# Patient Record
Sex: MALE | Race: NATIVE HAWAIIAN OR OTHER PACIFIC ISLANDER | HISPANIC OR LATINO | URBAN - METROPOLITAN AREA
[De-identification: names, ages, dates, MRNs, and addresses within clinical notes are randomized per-mention and may not be internally consistent; named-entity substitution may affect disease eponyms.]

---

## 2022-08-09 ENCOUNTER — PATIENT OUTREACH (OUTPATIENT)
Dept: OTHER | Facility: OTHER | Age: 37
End: 2022-08-09

## 2022-08-09 DIAGNOSIS — M54.9 MUSCULOSKELETAL BACK PAIN: Primary | ICD-10-CM

## 2022-08-09 RX ORDER — IBUPROFEN 600 MG/1
600 TABLET ORAL EVERY 6 HOURS PRN
Qty: 20 TABLET | Refills: 0 | Status: SHIPPED | OUTPATIENT
Start: 2022-08-09

## 2022-08-09 RX ORDER — BACLOFEN 10 MG/1
10 TABLET ORAL 3 TIMES DAILY PRN
Qty: 15 TABLET | Refills: 0 | Status: SHIPPED | OUTPATIENT
Start: 2022-08-09

## 2022-08-09 NOTE — PROGRESS NOTES
Pt was evaluated at the [employer] in the [facility] with [address] notes he has been newly homeless for the past 3 weeks  Currently living in a tent  Requests assistance with medical insurance and food stamps  Pt notes he has been trying to obtain a job, but noting barriers due to recent conviction  Pt notes he has 5-6 months of pain in his left upper back, radiating up his left scapula and to the base of his neck and his left neck muscles  No numbness/weakness  He is right handed  Does not carry any backpack/bags on his shoulder  Denies any trauma/heavy lifting  Denies any exacerbating/allviating factors  Notes full rom  Pt states he sleeps on his side, which may be triggering his symptoms  Has not tried any medication for this  PMH:   None    PSH:   None    Meds: none    Family hx:   Father  lung CA  H/o DM with amputations    Soc hx:  +tob 1/3ppd  No etoh  Rare MJ    PE  122/68  Gen: pleasant male  NAD  Non-tachypnic  Heart: RRR no m/r/g  Lungs; CTA bilat  No w/c/r  Abd: soft Nt/ND  NABS  No HSM  Ext: no edema  Full rom  LUE with full rom, int/ext rotation  M-skeletal:  Tender with palpation of left trap, sternocleidomastoid, left paraspinal muscles upper thoracic  A/P:  1-musculoskeletal pain:  Due to position and overuse  Rx; anti-inflammatory/muscle relaxant with ibuprofen/baclofen prn    2-homeless:  Pt will meet with [address] [facility] further to coordinate obtaining medical insurance, food stamps, job training/career center  Will also provide pt a list of where food and meals are being distributed daily locally      Pt will be referrd for PCP at [place] for routine health maintainence and lab work

## 2022-08-10 ENCOUNTER — PATIENT OUTREACH (OUTPATIENT)
Dept: OTHER | Facility: OTHER | Age: 37
End: 2022-08-10

## 2022-08-10 NOTE — PROGRESS NOTES
Tuesday August 9, 2022    Encountered client at [address] during [place]  Client requests to be evaluated on the [facility]  Client shared that he is homeless, staying in a tent, doesn't have health insurance and not receiving food stamps  Client interested in establishing care at [facility]  Will follow-up with client tomorrow to connect with PCP and send voucher to [address] [place] [address] during business hours  Client pleasant and verbalizes appreciation for assistance  Emotional support given

## 2022-08-15 ENCOUNTER — PATIENT OUTREACH (OUTPATIENT)
Dept: FAMILY MEDICINE CLINIC | Facility: CLINIC | Age: 37
End: 2022-08-15

## 2022-08-15 ENCOUNTER — OFFICE VISIT (OUTPATIENT)
Dept: FAMILY MEDICINE CLINIC | Facility: CLINIC | Age: 37
End: 2022-08-15

## 2022-08-15 VITALS
DIASTOLIC BLOOD PRESSURE: 60 MMHG | TEMPERATURE: 98.2 F | HEART RATE: 77 BPM | WEIGHT: 147 LBS | OXYGEN SATURATION: 96 % | HEIGHT: 72 IN | BODY MASS INDEX: 19.91 KG/M2 | RESPIRATION RATE: 19 BRPM | SYSTOLIC BLOOD PRESSURE: 118 MMHG

## 2022-08-15 DIAGNOSIS — H40.053 INCREASED PRESSURE IN THE EYE, BILATERAL: ICD-10-CM

## 2022-08-15 DIAGNOSIS — Z13.29 SCREENING FOR THYROID DISORDER: ICD-10-CM

## 2022-08-15 DIAGNOSIS — Z00.00 ANNUAL PHYSICAL EXAM: Primary | ICD-10-CM

## 2022-08-15 DIAGNOSIS — Z13.220 ENCOUNTER FOR LIPID SCREENING FOR CARDIOVASCULAR DISEASE: ICD-10-CM

## 2022-08-15 DIAGNOSIS — Z23 ENCOUNTER FOR IMMUNIZATION: ICD-10-CM

## 2022-08-15 DIAGNOSIS — Z01.20 ENCOUNTER FOR DENTAL EXAMINATION: ICD-10-CM

## 2022-08-15 DIAGNOSIS — Z13.1 SCREENING FOR DIABETES MELLITUS: ICD-10-CM

## 2022-08-15 DIAGNOSIS — Z11.4 ENCOUNTER FOR SCREENING FOR HUMAN IMMUNODEFICIENCY VIRUS (HIV): ICD-10-CM

## 2022-08-15 DIAGNOSIS — Z13.0 SCREENING FOR DEFICIENCY ANEMIA: ICD-10-CM

## 2022-08-15 DIAGNOSIS — Z59.9 FINANCIAL DIFFICULTIES: ICD-10-CM

## 2022-08-15 DIAGNOSIS — S16.1XXA NECK STRAIN, INITIAL ENCOUNTER: ICD-10-CM

## 2022-08-15 DIAGNOSIS — Z13.6 ENCOUNTER FOR LIPID SCREENING FOR CARDIOVASCULAR DISEASE: ICD-10-CM

## 2022-08-15 DIAGNOSIS — Z11.59 ENCOUNTER FOR HEPATITIS C SCREENING TEST FOR LOW RISK PATIENT: ICD-10-CM

## 2022-08-15 PROCEDURE — 99385 PREV VISIT NEW AGE 18-39: CPT | Performed by: PHYSICIAN ASSISTANT

## 2022-08-15 SDOH — ECONOMIC STABILITY - INCOME SECURITY: PROBLEM RELATED TO HOUSING AND ECONOMIC CIRCUMSTANCES, UNSPECIFIED: Z59.9

## 2022-08-15 NOTE — PROGRESS NOTES
[address] [facility] JAQUAN    NAME: Julia Bose  AGE: 40 y o  SEX: male  : 1985     DATE: 8/15/2022     Assessment and Plan:     Neck strain, initial encounter  Neck pain likely musculoskeletal in origin as well as his flank pain  Continue ibuprofen 3 times daily as needed with food  Baclofen as needed  Discussed possible side effects  Discussed alternating between heat and ice  Reviewed stretches with patient and will provide handout  Will follow up in one month and order PT at that time if no improvement  Follow up sooner if needed  ER precautions given  Annual physical exam  Discussed health maintenance  Will order routine lab work  Increased pressure in the eye, bilateral  Patient believes he was old several years ago that he may have glaucoma in his eyes  He did not follow up on it at that time  He denies any complaints currently  Will refer to ophthalmology for further evaluation  Financial difficulties  Patient would like to assistance  Open to speaking with [employer] and Social Work  Referrals placed  Warm hand off during visit  Encounter for immunization  Patient working on getting insurance  Will defer until next visit  Encounter for dental examination  Referred to [place] [facility]  Problem List Items Addressed This Visit        Musculoskeletal and Integument    Neck strain, initial encounter     Neck pain likely musculoskeletal in origin as well as his flank pain  Continue ibuprofen 3 times daily as needed with food  Baclofen as needed  Discussed possible side effects  Discussed alternating between heat and ice  Reviewed stretches with patient and will provide handout  Will follow up in one month and order PT at that time if no improvement  Follow up sooner if needed  ER precautions given  Other    Annual physical exam - Primary     Discussed health maintenance   Will order routine lab work  Increased pressure in the eye, bilateral     Patient believes he was old several years ago that he may have glaucoma in his eyes  He did not follow up on it at that time  He denies any complaints currently  Will refer to ophthalmology for further evaluation  Relevant Orders    Ambulatory Referral to Ophthalmology    Financial difficulties     Patient would like to assistance  Open to speaking with [employer] and Social Work  Referrals placed  Warm hand off during visit  Relevant Orders    Ambulatory referral to social work care management program    Ambulatory Referral to Financial Counseling Program    Encounter for immunization     Patient working on getting insurance  Will defer until next visit  Relevant Orders    TDAP VACCINE GREATER THAN OR EQUAL TO 6YO IM    Encounter for dental examination     Referred to [facility] dentistry  Relevant Orders    Ambulatory Referral to Dentistry    Encounter for screening for human immunodeficiency virus (HIV)    Relevant Orders    HIV 1/2 Antigen/Antibody (4th Generation) w Reflex [facility]    Encounter for hepatitis C screening test for low risk patient    Relevant Orders    Hepatitis C antibody      Other Visit Diagnoses     Screening for thyroid disorder        Relevant Orders    TSH, 3rd generation with Free T4 reflex    Screening for diabetes mellitus        Relevant Orders    HEMOGLOBIN A1C W/ EAG ESTIMATION    Comprehensive metabolic panel    Screening for deficiency anemia        Relevant Orders    CBC and differential    Encounter for lipid screening for cardiovascular disease        Relevant Orders    Lipid Panel with Direct LDL reflex          Immunizations and preventive care screenings were discussed with patient today  Appropriate education was printed on patient's after visit summary      Counseling:  Alcohol/drug use: discussed moderation in alcohol intake, the recommendations for healthy alcohol use, and avoidance of illicit drug use  Dental Health: discussed importance of regular tooth brushing, flossing, and dental visits  Injury prevention: discussed safety/seat belts, safety helmets, smoke detectors, carbon dioxide detectors, and smoking near bedding or upholstery  Sexual health: discussed sexually transmitted diseases, partner selection, use of condoms, avoidance of unintended pregnancy, and contraceptive alternatives  · Exercise: the importance of regular exercise/physical activity was discussed  Recommend exercise 3-5 times per week for at least 30 minutes  PHQ-2/9 Depression Screening    Little interest or pleasure in doing things: 0 - not at all  Feeling down, depressed, or hopeless: 1 - several days  PHQ-2 Score: 1  PHQ-2 Interpretation: Negative depression screen         Depression Screening and Follow-up Plan: Patient was screened for depression during today's encounter  They screened negative with a PHQ-2 score of 1  Tobacco Cessation Counseling: Tobacco cessation counseling was provided  The patient is sincerely urged to quit consumption of tobacco  He is not ready to quit tobacco  Medication options and side effects of medication discussed  Return in 4 weeks (on 9/12/2022) for Next scheduled follow up neck pain  Chief Complaint:     Chief Complaint   Patient presents with    New Patient Visit     Np here today c/oback pain and neck x couple months  per pt he start the new meds yesterday and states that Is working  baclofen and motrin      History of Present Illness:     Adult Annual Physical   Patient here for a comprehensive physical exam  The patient reports problems - neck pain  States it has been present for approximately two months or so  It is isolated to the left side  Non radiating  Increased pain with use  Ibuprofen and baclofen do provide relief  He is taking the ibuprofen 2-3 times daily with food  Baclofen at bedtime as it makes him tired   States his friend has been massaging it which also helps  Denies any numbness, tingling, or weakness  This has never happened before  No associated symptoms  No fever, chills, fatigue, or other prodromal symptoms  He also reports pain along his left side for about 2 weeks  The pain comes and goes  No trauma  He points to the left side directly above left hip  It is a sharp stabbing pain that last minutes or hours  No provocations  He has not tried anything for it yet  No associated symptoms as noted in ROS  Patient is also newly homeless  Approximately 4 weeks  Denies depression, anxiety, SI, or HI  Open to assistance  Diet and Physical Activity  · Diet/Nutrition: poor diet and limited fruits/vegetables  · Exercise: walking and more than 2 hours on average  Depression Screening  PHQ-2/9 Depression Screening    Little interest or pleasure in doing things: 0 - not at all  Feeling down, depressed, or hopeless: 1 - several days  PHQ-2 Score: 1  PHQ-2 Interpretation: Negative depression screen       General Health  · Sleep: sleeps well and gets 7-8 hours of sleep on average  · Hearing: normal - bilateral   · Vision: vision problems: possible glaucoma and most recent eye exam >1 year ago  · Dental: no dental visits for >1 year, brushes teeth twice daily and flosses teeth occasionally   Health  · History of STDs?: no      Review of Systems:     Review of Systems   Constitutional: Negative for appetite change, chills, diaphoresis, fatigue, fever and unexpected weight change  HENT: Negative for congestion, dental problem, hearing loss, sore throat, tinnitus and trouble swallowing  Eyes: Negative for photophobia, pain, redness and visual disturbance  Respiratory: Negative for cough, choking, chest tightness, shortness of breath, wheezing and stridor  Cardiovascular: Negative for chest pain, palpitations and leg swelling     Gastrointestinal: Negative for abdominal pain, blood in stool, constipation, diarrhea, nausea and vomiting  Endocrine: Negative for cold intolerance, heat intolerance, polydipsia, polyphagia and polyuria  Genitourinary: Positive for flank pain (left side)  Negative for difficulty urinating, dysuria, frequency, genital sores, hematuria, penile discharge, penile pain, penile swelling, scrotal swelling, testicular pain and urgency  Musculoskeletal: Positive for neck pain  Negative for arthralgias, back pain and myalgias  Skin: Negative for rash and wound  Hematological: Negative for adenopathy  Does not bruise/bleed easily  Psychiatric/Behavioral: Negative for agitation, behavioral problems, decreased concentration, dysphoric mood, self-injury, sleep disturbance and suicidal ideas  The patient is not nervous/anxious  Past Medical History:     History reviewed  No pertinent past medical history  Past Surgical History:     History reviewed  No pertinent surgical history  Social History:     Social History     Socioeconomic History    Marital status: Unknown     Spouse name: None    Number of children: None    Years of education: None    Highest education level: None   Occupational History    None   Tobacco Use    Smoking status: Current Every Day Smoker     Packs/day: 2 00     Types: Cigarettes    Smokeless tobacco: Former User   Substance and Sexual Activity    Alcohol use: None    Drug use: Yes     Types: Marijuana    Sexual activity: None   Other Topics Concern    None   Social History Narrative    None     Social Determinants of Health     Financial Resource Strain: Medium Risk    Difficulty of Paying Living Expenses: Somewhat hard   Food Insecurity: Food Insecurity Present    Worried About Running Out of Food in the Last Year: Sometimes true    Darlin of Food in the Last Year: Sometimes true   Transportation Needs: No Transportation Needs    Lack of Transportation (Medical): No    Lack of Transportation (Non-Medical):  No   Physical Activity: Not on file   Stress: Not on file   Social Connections: Not on file   Intimate Partner Violence: Not on file   Housing Stability: Not on file      Family History:     Family History   Problem Relation Age of Onset    Diabetes Mother     Cancer Father     Diabetes Father       Current Medications:     Current Outpatient Medications   Medication Sig Dispense Refill    baclofen 10 mg tablet Take 1 tablet (10 mg total) by mouth 3 (three) times a day as needed for muscle spasms 15 tablet 0    ibuprofen (MOTRIN) 600 mg tablet Take 1 tablet (600 mg total) by mouth every 6 (six) hours as needed for mild pain (take with food ) 20 tablet 0     No current facility-administered medications for this visit  Allergies:     No Known Allergies   Physical Exam:     /60 (BP Location: Left arm, Patient Position: Sitting, Cuff Size: Adult)   Pulse 77   Temp 98 2 °F (36 8 °C) (Temporal)   Resp 19   Ht 6' (1 829 m)   Wt 66 7 kg (147 lb)   SpO2 96%   BMI 19 94 kg/m²     Physical Exam  Vitals and nursing note reviewed  Constitutional:       General: He is awake  Appearance: Normal appearance  He is well-developed, well-groomed and normal weight  HENT:      Head: Normocephalic and atraumatic  Salivary Glands: Right salivary gland is not diffusely enlarged or tender  Left salivary gland is not diffusely enlarged or tender  Right Ear: Hearing, tympanic membrane, ear canal and external ear normal       Left Ear: Hearing, tympanic membrane, ear canal and external ear normal       Nose: Nose normal       Mouth/Throat:      Lips: Pink  No lesions  Mouth: Mucous membranes are moist       Tongue: No lesions  Tongue does not deviate from midline  Pharynx: Oropharynx is clear  Tonsils: No tonsillar exudate  Eyes:      General: Lids are normal       Extraocular Movements: Extraocular movements intact        Conjunctiva/sclera: Conjunctivae normal       Pupils: Pupils are equal, round, and reactive to light  Neck:      Thyroid: No thyroid mass, thyromegaly or thyroid tenderness  Vascular: No carotid bruit  Cardiovascular:      Rate and Rhythm: Normal rate and regular rhythm  Pulses: Normal pulses  Heart sounds: Normal heart sounds, S1 normal and S2 normal  No murmur heard  Pulmonary:      Effort: Pulmonary effort is normal  No respiratory distress  Breath sounds: Normal breath sounds  No stridor  No wheezing, rhonchi or rales  Abdominal:      General: Abdomen is flat  Bowel sounds are normal       Palpations: Abdomen is soft  Tenderness: There is no abdominal tenderness  Hernia: No hernia is present  Musculoskeletal:         General: No swelling  Normal range of motion  Cervical back: Normal range of motion and neck supple  Tenderness (left SC, paraspinals, and upper trapezius) present  No edema, erythema, signs of trauma, rigidity, torticollis or crepitus  Pain with movement (pain along left neck with movement) and muscular tenderness present  No spinous process tenderness  Normal range of motion  Right lower leg: No edema  Left lower leg: No edema  Lymphadenopathy:      Cervical: No cervical adenopathy  Right cervical: No superficial or posterior cervical adenopathy  Left cervical: No superficial or posterior cervical adenopathy  Skin:     General: Skin is warm and dry  Capillary Refill: Capillary refill takes less than 2 seconds  Findings: No rash  Neurological:      General: No focal deficit present  Mental Status: He is alert and oriented to person, place, and time  Mental status is at baseline  Cranial Nerves: Cranial nerves are intact  Sensory: Sensation is intact  Motor: Motor function is intact  Coordination: Coordination is intact  Gait: Gait is intact     Psychiatric:         Attention and Perception: Attention and perception normal          Mood and Affect: Mood normal  Speech: Speech normal          Behavior: Behavior normal  Behavior is cooperative  Thought Content:  Thought content normal          Cognition and Memory: Cognition normal          Judgment: Judgment normal           Margaux Galicia 35

## 2022-08-15 NOTE — ASSESSMENT & PLAN NOTE
Neck pain likely musculoskeletal in origin as well as his flank pain  Continue ibuprofen 3 times daily as needed with food  Baclofen as needed  Discussed possible side effects  Discussed alternating between heat and ice  Reviewed stretches with patient and will provide handout  Will follow up in one month and order PT at that time if no improvement  Follow up sooner if needed  ER precautions given

## 2022-08-15 NOTE — ASSESSMENT & PLAN NOTE
Patient believes he was old several years ago that he may have glaucoma in his eyes  He did not follow up on it at that time  He denies any complaints currently  Will refer to ophthalmology for further evaluation

## 2022-08-15 NOTE — ASSESSMENT & PLAN NOTE
Patient would like to assistance  Open to speaking with [employer] and Social Work  Referrals placed  Warm hand off during visit

## 2022-08-15 NOTE — PATIENT INSTRUCTIONS
Wellness Visit for Adults   AMBULATORY CARE:   A wellness visit  is when you see your healthcare provider to get screened for health problems  Your healthcare provider will also give you advice on how to stay healthy  Write down your questions so you remember to ask them  Ask your healthcare provider how often you should have a wellness visit  What happens at a wellness visit:  Your healthcare provider will ask about your health, and your family history of health problems  This includes high blood pressure, heart disease, and cancer  He or she will ask if you have symptoms that concern you, if you smoke, and about your mood  You may also be asked about your intake of medicines, supplements, food, and alcohol  Any of the following may be done:  · Your weight  will be checked  Your height may also be checked so your body mass index (BMI) can be calculated  Your BMI shows if you are at a healthy weight  · Your blood pressure  and heart rate will be checked  Your temperature may also be checked  · Blood and urine tests  may be done  Blood tests may be done to check your cholesterol levels  Abnormal cholesterol levels increase your risk for heart disease and stroke  You may also need a blood or urine test to check for diabetes if you are at increased risk  Urine tests may be done to look for signs of an infection or kidney disease  · A physical exam  includes checking your heartbeat and lungs with a stethoscope  Your healthcare provider may also check your skin to look for sun damage  · Screening tests  may be recommended  A screening test is done to check for diseases that may not cause symptoms  The screening tests you may need depend on your age, gender, family history, and lifestyle habits  For example, colorectal screening may be recommended if you are 48years old or older  Screening tests you need if you are a woman:   · A Pap smear  is used to screen for cervical cancer   Pap smears are usually done every 3 to 5 years depending on your age  You may need them more often if you have had abnormal Pap smear test results in the past  Ask your healthcare provider how often you should have a Pap smear  · A mammogram  is an x-ray of your breasts to screen for breast cancer  Experts recommend mammograms every 2 years starting at age 48 years  You may need a mammogram at age 52 years or younger if you have an increased risk for breast cancer  Talk to your healthcare provider about when you should start having mammograms and how often you need them  Vaccines you may need:   · Get an influenza vaccine  every year  The influenza vaccine protects you from the flu  Several types of viruses cause the flu  The viruses change over time, so new vaccines are made each year  · Get a tetanus-diphtheria (Td) booster vaccine  every 10 years  This vaccine protects you against tetanus and diphtheria  Tetanus is a severe infection that may cause painful muscle spasms and lockjaw  Diphtheria is a severe bacterial infection that causes a thick covering in the back of your mouth and throat  · Get a human papillomavirus (HPV) vaccine  if you are female and aged 23 to 32 or male 23 to 24 and never received it  This vaccine protects you from HPV infection  HPV is the most common infection spread by sexual contact  HPV may also cause vaginal, penile, and anal cancers  · Get a pneumococcal vaccine  if you are aged 72 years or older  The pneumococcal vaccine is an injection given to protect you from pneumococcal disease  Pneumococcal disease is an infection caused by pneumococcal bacteria  The infection may cause pneumonia, meningitis, or an ear infection  · Get a shingles vaccine  if you are 60 or older, even if you have had shingles before  The shingles vaccine is an injection to protect you from the varicella-zoster virus  This is the same virus that causes chickenpox   Shingles is a painful rash that develops in people who had chickenpox or have been exposed to the virus  How to eat healthy:  My Plate is a model for planning healthy meals  It shows the types and amounts of foods that should go on your plate  Fruits and vegetables make up about half of your plate, and grains and protein make up the other half  A serving of dairy is included on the side of your plate  The amount of calories and serving sizes you need depends on your age, gender, weight, and height  Examples of healthy foods are listed below:  · Eat a variety of vegetables  such as dark green, red, and orange vegetables  You can also include canned vegetables low in sodium (salt) and frozen vegetables without added butter or sauces  · Eat a variety of fresh fruits , canned fruit in 100% juice, frozen fruit, and dried fruit  · Include whole grains  At least half of the grains you eat should be whole grains  Examples include whole-wheat bread, wheat pasta, brown rice, and whole-grain cereals such as oatmeal     · Eat a variety of protein foods such as seafood (fish and shellfish), lean meat, and poultry without skin (turkey and chicken)  Examples of lean meats include pork leg, shoulder, or tenderloin, and beef round, sirloin, tenderloin, and extra lean ground beef  Other protein foods include eggs and egg substitutes, beans, peas, soy products, nuts, and seeds  · Choose low-fat dairy products such as skim or 1% milk or low-fat yogurt, cheese, and cottage cheese  · Limit unhealthy fats  such as butter, hard margarine, and shortening  Exercise:  Exercise at least 30 minutes per day on most days of the week  Some examples of exercise include walking, biking, dancing, and swimming  You can also fit in more physical activity by taking the stairs instead of the elevator or parking farther away from stores  Include muscle strengthening activities 2 days each week  Regular exercise provides many health benefits   It helps you manage your weight, and decreases your risk for type 2 diabetes, heart disease, stroke, and high blood pressure  Exercise can also help improve your mood  Ask your healthcare provider about the best exercise plan for you  General health and safety guidelines:   · Do not smoke  Nicotine and other chemicals in cigarettes and cigars can cause lung damage  Ask your healthcare provider for information if you currently smoke and need help to quit  E-cigarettes or smokeless tobacco still contain nicotine  Talk to your healthcare provider before you use these products  · Limit alcohol  A drink of alcohol is 12 ounces of beer, 5 ounces of wine, or 1½ ounces of liquor  · Lose weight, if needed  Being overweight increases your risk of certain health conditions  These include heart disease, high blood pressure, type 2 diabetes, and certain types of cancer  · Protect your skin  Do not sunbathe or use tanning beds  Use sunscreen with a SPF 15 or higher  Apply sunscreen at least 15 minutes before you go outside  Reapply sunscreen every 2 hours  Wear protective clothing, hats, and sunglasses when you are outside  · Drive safely  Always wear your seatbelt  Make sure everyone in your car wears a seatbelt  A seatbelt can save your life if you are in an accident  Do not use your cell phone when you are driving  This could distract you and cause an accident  Pull over if you need to make a call or send a text message  · Practice safe sex  Use latex condoms if are sexually active and have more than one partner  Your healthcare provider may recommend screening tests for sexually transmitted infections (STIs)  · Wear helmets, lifejackets, and protective gear  Always wear a helmet when you ride a bike or motorcycle, go skiing, or play sports that could cause a head injury  Wear protective equipment when you play sports  Wear a lifejacket when you are on a boat or doing water sports      © Copyright [employer] 2022 Information is for End User's use only and may not be sold, redistributed or otherwise used for commercial purposes  All illustrations and images included in CareNotes® are the copyrighted property of A D A M , Inc  or Chris Le  The above information is an  only  It is not intended as medical advice for individual conditions or treatments  Talk to your doctor, nurse or pharmacist before following any medical regimen to see if it is safe and effective for you  Cigarette Smoking and Your Health   AMBULATORY CARE:   Risks to your health if you smoke:  Nicotine and other chemicals found in tobacco and e-cigarettes can damage every cell in your body  Even if you are a light smoker, you have an increased risk for cancer, heart disease, and lung disease  If you are pregnant or have diabetes, smoking increases your risk for complications  Nicotine can affect an adolescent's developing brain  This can lead to trouble thinking, learning, or paying attention  Benefits to your health if you stop smoking:   · You decrease respiratory symptoms such as coughing, wheezing, and shortness of breath  · You reduce your risk for cancers of the lung, mouth, throat, kidney, bladder, pancreas, stomach, and cervix  If you already have cancer, you increase the benefits of chemotherapy  You also reduce your risk for cancer returning or a second cancer from developing  · You reduce your risk for heart disease, blood clots, heart attack, and stroke  · You reduce your risk for lung infections, and diseases such as pneumonia, asthma, chronic bronchitis, and emphysema  · Your circulation improves  More oxygen can be delivered to your body  If you have diabetes, you lower your risk for complications, such as kidney, artery, and eye diseases  You also lower your risk for nerve damage  Nerve damage can lead to amputations, poor vision, and blindness      · You improve your body's ability to heal and to fight infections  · An adolescent can help his or her brain and body develop in a healthy way  Talk to your adolescent about all the health risks of nicotine  If you can, start talking about nicotine when your child is younger than 12 years  This may make it easier for him or her not to start using nicotine as a teenager or adult  Explain to him or her that it is best never to start  It can be hard to try to quit later  Benefits to the health of others if you stop smoking:  Tobacco is harmful to nonsmokers who breathe in your secondhand smoke  The following are ways the health of others around you may improve when you stop smoking:  · You lower the risks for lung cancer and heart disease in nonsmoking adults  · If you are pregnant, you lower the risk for miscarriage, early delivery, low birth weight, and stillbirth  You also lower your baby's risk for SIDS, obesity, developmental delay, and neurobehavioral problems, such as ADHD  · If you have children, you lower their risk for ear infections, colds, pneumonia, bronchitis, and asthma  Follow up with your doctor as directed:  Write down your questions so you remember to ask them during your visits  For support and more information:   · American Lung Association  [address] [facility]  [address]  Phone: [facility] [address]  Phone: 2- 426 - 189-5220  Web Address: [employer]    · Smokefree  gov  Phone: 5- 362 - 021-8436  Web Address: www smokefree  [facility] 21 2022 Information is for End User's use only and may not be sold, redistributed or otherwise used for commercial purposes  All illustrations and images included in CareNotes® are the copyrighted property of [employer]  or [facility] Aryan Robledo   The above information is an  only  It is not intended as medical advice for individual conditions or treatments   Talk to your doctor, nurse or pharmacist before following any medical regimen to see if it is safe and effective for you

## 2022-08-16 NOTE — PROGRESS NOTES
BERNARDINO RODRIGUEZ had received an in-basket message from [facility] requesting social work meet with patient at his office visit  BERNARDINO RODRIGUEZ did consult with provider, Sadaf Sweet  The patient does not have insurance is homeless, needs SNAP benefits  He has hx of glaucoma but does not need an urgent appt  BERNARDINO RODRIGUEZ did meet with the patient, Jocelin Brown following office visit in the patient room  He has never had SNAP benefits  BERNARDINO RODRIGUEZ will provide him the number to contact to begin process  Jocelin Brown does not have any difficulty with transportation  He lives close to [facility] and does not have any specialist appts scheduled  Jocelin Brown is not currently employed  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[employer], 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  His father had passed away 1 month ago and since the job was new he was not able to take time off  While the news of his father being dx with cancer was recent it did progress quickly and he had passed away within 3 weeks  Jocelin Brown did feel prepared enough for it and he and his siblings were able to carry out their father's wishes  Jocelin Brown is experiencing difficulty locating new job as he recently acquired theft charges  He described it as a foolish action  He got a misdemeanor and feels no one is going to hire someone with a theft charge  BERNARDINO RODRIGUEZ and Jocelin Brown did brainstorm options  BERNARDINO RODRIGUEZ did provide an information sheet on [employer]  Jocelin Brown is currently homeless  He left his apartment as he could no longer afford it  He did not want to go through the eviction process  He is currently residing in a tent  Jocelin Brown has never had insurance  BERNARDINO RODRIGUEZ did show him where the financial counselor's office is located  BERNARDINO RODRIGUEZ advised in order to qualify for rental assistance he needs an income  Jocelin Brown expressed understanding  Titi's phone is not currently working, however BERNARDINO RODRIGUEZ can reach out to this friend that he trusts to get him messages at [phone]      BERNARDINO RODRIGUEZ and Titi discussed homelessness resources  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

## 2022-08-17 NOTE — PROGRESS NOTES
8/17/22:    BERNARDINO RODRIGUEZ did place follow up call to Titi to provide the number to apply for SNAP benefits  BERNARDINO RODRIGUEZ left message with the number ([phone])  BERNARDINO RODRIGUEZ received multiple missed calls from Candace Cerrato and left additional message  BERNARDINO RODRIGUEZ did receive additional callback  He advised that he did contact that number BERNARDINO RODIRGUEZ provided anck this afternoon or tomorrow morning if does not receive that return call to finish the process  He agreed  BERNARDINO RODRIGUEZ will continue to remain available for psychosocial support as needed

## 2022-11-02 ENCOUNTER — PATIENT OUTREACH (OUTPATIENT)
Dept: FAMILY MEDICINE CLINIC | Facility: CLINIC | Age: 37
End: 2022-11-02

## 2022-11-02 NOTE — PROGRESS NOTES
BERNARDINO RODRIGUEZ noted in chart the patient has [employer] listed as insurance  BERNARDINO RODRIGUEZ did then review Guarantor Account which reflected that medical assistance is active per [facility]  BERNARDINO RODRIGUEZ did place follow up call to the patient but phone still out of service  BERNARDINO RODRIGUEZ place call to the patient's friend whom Shante Mora had provided as an alternate contact  BERNADRINO RODRIGUEZ noted in chart no upcoming appointments at this time  BERNARDINO RODRIGUEZ unable to send Unable to Reach letter as patient was homeless at time of last outreach

## 2024-02-05 ENCOUNTER — OFFICE VISIT (OUTPATIENT)
Dept: FAMILY MEDICINE CLINIC | Facility: CLINIC | Age: 39
End: 2024-02-05

## 2024-02-05 VITALS
DIASTOLIC BLOOD PRESSURE: 60 MMHG | BODY MASS INDEX: 22 KG/M2 | HEART RATE: 86 BPM | WEIGHT: 166 LBS | RESPIRATION RATE: 16 BRPM | TEMPERATURE: 98 F | HEIGHT: 73 IN | SYSTOLIC BLOOD PRESSURE: 102 MMHG | OXYGEN SATURATION: 99 %

## 2024-02-05 DIAGNOSIS — M54.6 CHRONIC LEFT-SIDED THORACIC BACK PAIN: Primary | ICD-10-CM

## 2024-02-05 DIAGNOSIS — M62.838 MUSCLE SPASM: ICD-10-CM

## 2024-02-05 DIAGNOSIS — Z11.4 SCREENING FOR HIV (HUMAN IMMUNODEFICIENCY VIRUS): ICD-10-CM

## 2024-02-05 DIAGNOSIS — G89.29 CHRONIC LEFT-SIDED THORACIC BACK PAIN: Primary | ICD-10-CM

## 2024-02-05 DIAGNOSIS — Z59.9 FINANCIAL DIFFICULTIES: ICD-10-CM

## 2024-02-05 DIAGNOSIS — Z59.82 INABILITY TO ACQUIRE TRANSPORTATION: ICD-10-CM

## 2024-02-05 DIAGNOSIS — Z11.59 ENCOUNTER FOR HEPATITIS C SCREENING TEST FOR LOW RISK PATIENT: ICD-10-CM

## 2024-02-05 DIAGNOSIS — M54.2 CERVICAL SPINE PAIN: ICD-10-CM

## 2024-02-05 DIAGNOSIS — Z59.41 FOOD INSECURITY: ICD-10-CM

## 2024-02-05 DIAGNOSIS — H40.053 INCREASED PRESSURE IN THE EYE, BILATERAL: ICD-10-CM

## 2024-02-05 DIAGNOSIS — Z13.220 ENCOUNTER FOR SCREENING FOR LIPID DISORDER: ICD-10-CM

## 2024-02-05 PROBLEM — S16.1XXA NECK STRAIN, INITIAL ENCOUNTER: Status: RESOLVED | Noted: 2022-08-15 | Resolved: 2024-02-05

## 2024-02-05 PROCEDURE — 99214 OFFICE O/P EST MOD 30 MIN: CPT | Performed by: FAMILY MEDICINE

## 2024-02-05 RX ORDER — BACLOFEN 10 MG/1
10 TABLET ORAL 3 TIMES DAILY PRN
Qty: 20 TABLET | Refills: 0 | Status: SHIPPED | OUTPATIENT
Start: 2024-02-05

## 2024-02-05 RX ORDER — IBUPROFEN 600 MG/1
600 TABLET ORAL EVERY 6 HOURS PRN
Qty: 30 TABLET | Refills: 1 | Status: SHIPPED | OUTPATIENT
Start: 2024-02-05

## 2024-02-05 SDOH — ECONOMIC STABILITY - TRANSPORTATION SECURITY: TRANSPORTATION INSECURITY: Z59.82

## 2024-02-05 SDOH — ECONOMIC STABILITY - FOOD INSECURITY: FOOD INSECURITY: Z59.41

## 2024-02-05 SDOH — ECONOMIC STABILITY - INCOME SECURITY: PROBLEM RELATED TO HOUSING AND ECONOMIC CIRCUMSTANCES, UNSPECIFIED: Z59.9

## 2024-02-05 NOTE — ASSESSMENT & PLAN NOTE
Chronic back pain  No red flag symptoms  Trial of muscle relaxants and NSAIDs  Referral to PT for further evaluation  Consider x-ray if symptoms persist

## 2024-02-05 NOTE — PROGRESS NOTES
Name: Titi Hickey      : 1985      MRN: [id number]  Encounter Provider: Donnie Cole MD  Encounter Date: 2024   Encounter department: [facility]    Assessment & Plan     1. Chronic left-sided thoracic back pain  Assessment & Plan:  Chronic back pain  No red flag symptoms  Trial of muscle relaxants and NSAIDs  Referral to PT for further evaluation  Consider x-ray if symptoms persist    Orders:  -     baclofen 10 mg tablet; Take 1 tablet (10 mg total) by mouth 3 (three) times a day as needed for muscle spasms  -     ibuprofen (MOTRIN) 600 mg tablet; Take 1 tablet (600 mg total) by mouth every 6 (six) hours as needed for mild pain (take with food.)  -     Ambulatory Referral to Physical Therapy; Future    2. Increased pressure in the eye, bilateral  Assessment & Plan:  Per patient he has a history of glaucoma  He does have visual disturbances  Referral to ophthalmology    Orders:  -     Ambulatory Referral to Ophthalmology; Future    3. Cervical spine pain  Assessment & Plan:  See management of thoracic back pain    Orders:  -     baclofen 10 mg tablet; Take 1 tablet (10 mg total) by mouth 3 (three) times a day as needed for muscle spasms  -     ibuprofen (MOTRIN) 600 mg tablet; Take 1 tablet (600 mg total) by mouth every 6 (six) hours as needed for mild pain (take with food.)  -     Ambulatory Referral to Physical Therapy; Future    4. Muscle spasm  Assessment & Plan:  Recommend stretch, warm compresses, NSAIDs as needed  Check CK    Orders:  -     baclofen 10 mg tablet; Take 1 tablet (10 mg total) by mouth 3 (three) times a day as needed for muscle spasms  -     ibuprofen (MOTRIN) 600 mg tablet; Take 1 tablet (600 mg total) by mouth every 6 (six) hours as needed for mild pain (take with food.)  -     CBC and differential; Future  -     Comprehensive metabolic panel; Future  -     CK; Future    5. Screening for HIV (human immunodeficiency virus)  -      HIV 1/2 AG/AB w Reflex [facility] for 2 yr old and above; Future    6. Encounter for hepatitis C screening test for low risk patient  -     Hepatitis C antibody; Future    7. Financial difficulties  -     Ambulatory referral to social work care management program; Future; Expected date: 02/05/2024    8. Inability to acquire transportation  -     Ambulatory referral to social work care management program; Future; Expected date: 02/05/2024    9. Food insecurity  -     Ambulatory referral to social work care management program; Future; Expected date: 02/05/2024    10. Encounter for screening for lipid disorder  -     Lipid panel; Future           Subjective      38-year-old male with no significant past medical history who presents today for musculoskeletal complaint.  Patient reports chronic pain in the back of his neck.  He also has pain in his thoracic spine area.  This is a chronic issue for him.  He denies any recent injury.  He has not taken anything for pain relief.  Patient reports he also occasionally get painful intermittent muscle spasm particularly in his calf muscles bilaterally          Review of Systems   Constitutional:  Negative for chills, diaphoresis, fatigue and fever.   Eyes:  Positive for visual disturbance. Negative for pain and redness.   Respiratory:  Negative for shortness of breath.    Cardiovascular:  Negative for chest pain and palpitations.   Gastrointestinal:  Negative for abdominal pain, nausea and vomiting.   Genitourinary:  Negative for dysuria, hematuria and urgency.   Musculoskeletal:  Positive for arthralgias and back pain.   Skin:  Negative for rash.   Neurological:  Negative for dizziness, syncope, weakness, numbness and headaches.   Psychiatric/Behavioral:  Negative for confusion.        Current Outpatient Medications on File Prior to Visit   Medication Sig    [DISCONTINUED] baclofen 10 mg tablet Take 1 tablet (10 mg total) by mouth 3 (three) times a day as needed for muscle spasms     "[DISCONTINUED] ibuprofen (MOTRIN) 600 mg tablet Take 1 tablet (600 mg total) by mouth every 6 (six) hours as needed for mild pain (take with food.)       Objective     /60 (BP Location: Left arm, Patient Position: Sitting, Cuff Size: Standard)   Pulse 86   Temp 98 °F (36.7 °C) (Temporal)   Resp 16   Ht 6' 1\" (1.854 m)   Wt 75.3 kg (166 lb)   SpO2 99%   BMI 21.90 kg/m²     Physical Exam  Constitutional:       General: He is not in acute distress.     Appearance: He is well-developed. He is not ill-appearing, toxic-appearing or diaphoretic.   HENT:      Head: Normocephalic and atraumatic.      Right Ear: External ear normal.      Left Ear: External ear normal.      Nose: Nose normal.      Mouth/Throat:      Pharynx: No oropharyngeal exudate.   Eyes:      General: No scleral icterus.        Right eye: No discharge.         Left eye: No discharge.      Extraocular Movements: Extraocular movements intact.      Conjunctiva/sclera: Conjunctivae normal.   Neck:      Vascular: No carotid bruit.   Cardiovascular:      Rate and Rhythm: Normal rate and regular rhythm.      Heart sounds: Normal heart sounds. No murmur heard.     No friction rub. No gallop.   Pulmonary:      Effort: Pulmonary effort is normal. No respiratory distress.      Breath sounds: Normal breath sounds. No stridor. No wheezing or rhonchi.   Abdominal:      General: Bowel sounds are normal. There is no distension.      Palpations: Abdomen is soft. There is no mass.      Tenderness: There is no abdominal tenderness. There is no guarding or rebound.      Hernia: No hernia is present.   Musculoskeletal:         General: No deformity or signs of injury. Normal range of motion.      Cervical back: Normal range of motion and neck supple. No deformity, erythema, spasms or tenderness. Normal range of motion.      Thoracic back: No swelling, spasms or tenderness. Normal range of motion.      Right lower leg: No edema.      Left lower leg: No edema. "   Skin:     General: Skin is warm.      Capillary Refill: Capillary refill takes less than 2 seconds.      Findings: No bruising, erythema, lesion or rash.   Neurological:      General: No focal deficit present.      Mental Status: He is alert and oriented to person, place, and time.      Cranial Nerves: No cranial nerve deficit.      Sensory: No sensory deficit.      Motor: No weakness.      Coordination: Coordination normal.   Psychiatric:         Mood and Affect: Mood normal.       Donnie Cole MD

## 2024-02-05 NOTE — ASSESSMENT & PLAN NOTE
Per patient he has a history of glaucoma  He does have visual disturbances  Referral to ophthalmology

## 2024-02-14 ENCOUNTER — APPOINTMENT (OUTPATIENT)
Dept: LAB | Facility: CLINIC | Age: 39
End: 2024-02-14
Payer: COMMERCIAL

## 2024-02-18 ENCOUNTER — HOSPITAL ENCOUNTER (EMERGENCY)
Facility: HOSPITAL | Age: 39
Discharge: HOME | End: 2024-02-18
Attending: EMERGENCY MEDICINE
Payer: COMMERCIAL

## 2024-02-18 VITALS
DIASTOLIC BLOOD PRESSURE: 74 MMHG | BODY MASS INDEX: 21.69 KG/M2 | HEART RATE: 77 BPM | OXYGEN SATURATION: 99 % | WEIGHT: 164.4 LBS | RESPIRATION RATE: 16 BRPM | SYSTOLIC BLOOD PRESSURE: 142 MMHG | TEMPERATURE: 98.4 F

## 2024-02-18 DIAGNOSIS — M62.838 MUSCLE SPASM: Primary | ICD-10-CM

## 2024-02-18 DIAGNOSIS — R23.4 CRACKED SKIN ON FEET: ICD-10-CM

## 2024-02-18 PROCEDURE — 99284 EMERGENCY DEPT VISIT MOD MDM: CPT

## 2024-02-18 PROCEDURE — 99282 EMERGENCY DEPT VISIT SF MDM: CPT

## 2024-02-18 RX ORDER — GINSENG 100 MG
1 CAPSULE ORAL 2 TIMES DAILY
Qty: 28 G | Refills: 0 | Status: SHIPPED | OUTPATIENT
Start: 2024-02-18

## 2024-02-18 RX ORDER — METHOCARBAMOL 500 MG/1
500 TABLET, FILM COATED ORAL 2 TIMES DAILY
Qty: 20 TABLET | Refills: 0 | Status: SHIPPED | OUTPATIENT
Start: 2024-02-18

## 2024-02-18 RX ORDER — VIT E ACET/GLY/DIMETH/WATER
LOTION (ML) TOPICAL 2 TIMES DAILY
Qty: 237 ML | Refills: 0 | Status: SHIPPED | OUTPATIENT
Start: 2024-02-18

## 2024-02-19 NOTE — ED PROVIDER NOTES
"History  Chief Complaint   Patient presents with    Foot Laceration     Pt states has cut on his right foot \"from dry skin\" no injury/trauma     This 38-year-old male presents today with concerns of cut on his plantar toes because of dry skin.  Patient states that he has been walking on this and is able to ambulate however every touch the area does hurt.  Has not applied any creams and has has been an ongoing issue for several weeks.  Patient states he did not put any creams or medication on the area because he \"did not know what to do\".  Denies any other symptoms.  No fever or chills.        Prior to Admission Medications   Prescriptions Last Dose Informant Patient Reported? Taking?   baclofen 10 mg tablet Not Taking  No No   Sig: Take 1 tablet (10 mg total) by mouth 3 (three) times a day as needed for muscle spasms   Patient not taking: Reported on 2/18/2024   ibuprofen (MOTRIN) 600 mg tablet Not Taking  No No   Sig: Take 1 tablet (600 mg total) by mouth every 6 (six) hours as needed for mild pain (take with food.)   Patient not taking: Reported on 2/18/2024      Facility-Administered Medications: None       History reviewed. No pertinent past medical history.    History reviewed. No pertinent surgical history.    Family History   Problem Relation Age of Onset    Diabetes Mother     Cancer Father     Diabetes Father      I have reviewed and agree with the history as documented.    E-Cigarette/Vaping    E-Cigarette Use Never User      E-Cigarette/Vaping Substances     Social History     Tobacco Use    Smoking status: Every Day     Current packs/day: 0.50     Types: Cigarettes    Smokeless tobacco: Former   Vaping Use    Vaping status: Never Used   Substance Use Topics    Alcohol use: Never    Drug use: Yes     Types: Marijuana       Review of Systems   Constitutional:  Negative for chills and fever.   HENT:  Negative for ear pain and sore throat.    Eyes:  Negative for pain and visual disturbance.   Respiratory:  " Negative for cough and shortness of breath.    Cardiovascular:  Negative for chest pain and palpitations.   Gastrointestinal:  Negative for abdominal pain and vomiting.   Genitourinary:  Negative for dysuria and hematuria.   Musculoskeletal:  Positive for arthralgias. Negative for back pain.   Skin:  Positive for wound. Negative for color change and rash.   Neurological:  Negative for seizures and syncope.   All other systems reviewed and are negative.      Physical Exam  Physical Exam  Vitals and nursing note reviewed.   Constitutional:       General: He is not in acute distress.     Appearance: He is well-developed.   HENT:      Head: Normocephalic and atraumatic.   Eyes:      Conjunctiva/sclera: Conjunctivae normal.   Cardiovascular:      Rate and Rhythm: Normal rate and regular rhythm.      Heart sounds: No murmur heard.  Pulmonary:      Effort: Pulmonary effort is normal. No respiratory distress.      Breath sounds: Normal breath sounds.   Abdominal:      Palpations: Abdomen is soft.      Tenderness: There is no abdominal tenderness.   Musculoskeletal:         General: No swelling or tenderness.      Cervical back: Neck supple.      Right lower leg: No edema.      Left lower leg: No edema.      Comments: Plantar portion of bilateral distal feet including the underside of each toe appears cracked and dry.  No bleeding.  No lacerations present tender on palpation.  No area of surrounding erythema or signs of infection.   Skin:     General: Skin is warm and dry.      Capillary Refill: Capillary refill takes less than 2 seconds.   Neurological:      Mental Status: He is alert.   Psychiatric:         Mood and Affect: Mood normal.         Vital Signs  ED Triage Vitals [02/18/24 0854]   Temperature Pulse Respirations Blood Pressure SpO2   98.4 °F (36.9 °C) 77 16 142/74 99 %      Temp Source Heart Rate Source Patient Position - Orthostatic VS BP Location FiO2 (%)   Tympanic Monitor Sitting Left arm --      Pain Score        --           Vitals:    02/18/24 0854   BP: 142/74   Pulse: 77   Patient Position - Orthostatic VS: Sitting         Visual Acuity      ED Medications  Medications - No data to display    Diagnostic Studies  Results Reviewed       None                   No orders to display              Procedures  Procedures         ED Course                               SBIRT 22yo+      Flowsheet Row Most Recent Value   Initial Alcohol Screen: US AUDIT-C     1. How often do you have a drink containing alcohol? 0 Filed at: 02/18/2024 0855   2. How many drinks containing alcohol do you have on a typical day you are drinking?  0 Filed at: 02/18/2024 0855   3a. Male UNDER 65: How often do you have five or more drinks on one occasion? 0 Filed at: 02/18/2024 0855   Audit-C Score 0 Filed at: 02/18/2024 0855   FREDY: How many times in the past year have you...    Used an illegal drug or used a prescription medication for non-medical reasons? Never Filed at: 02/18/2024 0855                      Medical Decision Making  38-year-old male presenting today with bilateral toe pain.  Exam consistent with dry skin and cracked skin.  No infections.  Patient also admitting to some muscle aches from increased workload.  No tenderness on exam.  No injuries.  Will provide Robaxin for muscle aches and spasms and give topical creams to prevent infection of his toes and outpatient changes such frequently and keep the area clean.  ------------------------------------------------------------  Strict return precautions discussed. Patient at time of discharge well-appearing in no acute distress, all questions answered. Patient agreeable to plan.  Patient's vitals, lab/imaging results, diagnosis, and treatment plan were discussed with the patient. All new/changed medications were discussed with patient, specifically, route of administration, how often and when to take, and where they can be picked up. Strict return precautions as well as close follow up  "with PCP was discussed with the patient and the patient was agreeable to my recommendations.  Patient verbally acknowledged understanding of the above communications. All labs reviewed and utilized in the medical decision making process (if labs were ordered). Portions of the record may have been created with voice recognition software.  Occasional wrong word or \"sound a like\" substitutions may have occurred due to the inherent limitations of voice recognition software.  Read the chart carefully and recognize, using context, where substitutions have occurred.      Risk  OTC drugs.  Prescription drug management.             Disposition  Final diagnoses:   Muscle spasm   Cracked skin on feet     Time reflects when diagnosis was documented in both MDM as applicable and the Disposition within this note       Time User Action Codes Description Comment    2/18/2024  9:04 AM Shashank Villanueva [M62.838] Muscle spasm     2/18/2024  9:04 AM Shashank Villanueva [R23.4] Cracked skin on feet           ED Disposition       ED Disposition   Discharge    Condition   Stable    Date/Time   Sun Feb 18, 2024 0904    Comment   Titi Hickey discharge to home/self care.                   Follow-up Information       Follow up With Specialties Details Why Contact Info Additional Information    [facility] Emergency Department Emergency Medicine Go to  If symptoms worsen [address] [facility] [address] [phone] [facility] Emergency Department    Donnie Cole MD Family Medicine Schedule an appointment as soon as possible for a visit  As needed [address] [place] [id number]  [phone]               Discharge Medication List as of 2/18/2024  9:05 AM        START taking these medications    Details   bacitracin topical ointment 500 units/g topical ointment Apply 1 large application topically 2 (two) times a day, Starting Sun 2/18/2024, " Normal      cetaphil (CETAPHIL) lotion Apply topically 2 (two) times a day, Starting Sun 2/18/2024, Normal      methocarbamol (ROBAXIN) 500 mg tablet Take 1 tablet (500 mg total) by mouth 2 (two) times a day, Starting Sun 2/18/2024, Normal           CONTINUE these medications which have NOT CHANGED    Details   baclofen 10 mg tablet Take 1 tablet (10 mg total) by mouth 3 (three) times a day as needed for muscle spasms, Starting Mon 2/5/2024, Normal      ibuprofen (MOTRIN) 600 mg tablet Take 1 tablet (600 mg total) by mouth every 6 (six) hours as needed for mild pain (take with food.), Starting Mon 2/5/2024, Normal             No discharge procedures on file.    PDMP Review       None            ED Provider  Electronically Signed by             Shashank Villanueva PA-C  02/19/24 5604

## 2024-02-22 ENCOUNTER — OFFICE VISIT (OUTPATIENT)
Dept: FAMILY MEDICINE CLINIC | Facility: CLINIC | Age: 39
End: 2024-02-22

## 2024-02-22 VITALS
HEIGHT: 73 IN | TEMPERATURE: 98 F | OXYGEN SATURATION: 96 % | DIASTOLIC BLOOD PRESSURE: 60 MMHG | RESPIRATION RATE: 16 BRPM | BODY MASS INDEX: 22 KG/M2 | WEIGHT: 166 LBS | SYSTOLIC BLOOD PRESSURE: 98 MMHG | HEART RATE: 77 BPM

## 2024-02-22 DIAGNOSIS — M54.6 CHRONIC LEFT-SIDED THORACIC BACK PAIN: ICD-10-CM

## 2024-02-22 DIAGNOSIS — Z00.00 ANNUAL PHYSICAL EXAM: Primary | ICD-10-CM

## 2024-02-22 DIAGNOSIS — G89.29 CHRONIC LEFT-SIDED THORACIC BACK PAIN: ICD-10-CM

## 2024-02-22 PROCEDURE — 99395 PREV VISIT EST AGE 18-39: CPT | Performed by: FAMILY MEDICINE

## 2024-02-22 NOTE — PROGRESS NOTES
ADULT ANNUAL PHYSICAL  [facility]    NAME: Titi Hickey  AGE: 38 y.o. SEX: male  : 1985     DATE: 2024     Assessment and Plan:     Problem List Items Addressed This Visit       Annual physical exam - Primary    Chronic left-sided thoracic back pain     Recommend outpatient follow-up with physical therapy              Immunizations and preventive care screenings were discussed with patient today. Appropriate education was printed on patient's after visit summary.    Counseling:  Alcohol/drug use: discussed moderation in alcohol intake, the recommendations for healthy alcohol use, and avoidance of illicit drug use.  Injury prevention: discussed safety/seat belts, safety helmets, smoke detectors, carbon dioxide detectors, and smoking near bedding or upholstery.  Exercise: the importance of regular exercise/physical activity was discussed. Recommend exercise 3-5 times per week for at least 30 minutes.          Return if symptoms worsen or fail to improve.     Chief Complaint:     Chief Complaint   Patient presents with    Follow-up    Back Pain      History of Present Illness:     Adult Annual Physical   Patient here for a comprehensive physical exam. The patient reports problems - patient is still experiencing back pain.  He has not followed up with physical therapy yet. .  Patient also wants to review his recent lab work    Diet and Physical Activity  Diet/Nutrition: well balanced diet.   Exercise: no formal exercise.      Depression Screening  PHQ-2/9 Depression Screening           General Health  Sleep: sleeps well.   Hearing: normal - bilateral.  Vision: vision problems: eye pressure-  will follow up with ophtalmology .   Dental: regular dental visits.        Health  History of STDs?: no.    Advanced Care Planning  Do you have an advanced directive? no  Do you have a durable medical power of ? no  ACP document  given to the patient? no     Review of Systems:     Review of Systems   Constitutional:  Negative for chills, diaphoresis, fatigue and fever.   HENT:  Negative for congestion.    Eyes:  Positive for visual disturbance.   Respiratory:  Negative for shortness of breath and wheezing.    Cardiovascular:  Negative for chest pain.   Gastrointestinal:  Negative for abdominal pain, diarrhea, nausea and vomiting.   Endocrine: Negative for polydipsia, polyphagia and polyuria.   Musculoskeletal:  Positive for back pain.   Skin:  Negative for rash.   Neurological:  Negative for seizures, syncope and light-headedness.   Psychiatric/Behavioral:  Negative for confusion.       Past Medical History:     History reviewed. No pertinent past medical history.   Past Surgical History:     History reviewed. No pertinent surgical history.   Social History:     Social History     Socioeconomic History    Marital status: Single     Spouse name: None    Number of children: None    Years of education: None    Highest education level: None   Occupational History    None   Tobacco Use    Smoking status: Every Day     Current packs/day: 0.50     Types: Cigarettes    Smokeless tobacco: Former   Vaping Use    Vaping status: Never Used   Substance and Sexual Activity    Alcohol use: Never    Drug use: Yes     Types: Marijuana    Sexual activity: None   Other Topics Concern    None   Social History Narrative    None     Social Determinants of Health     Financial Resource Strain: High Risk (2/5/2024)    Overall Financial Resource Strain (CARDIA)     Difficulty of Paying Living Expenses: Very hard   Food Insecurity: Food Insecurity Present (2/5/2024)    Hunger Vital Sign     Worried About Running Out of Food in the Last Year: Sometimes true     Ran Out of Food in the Last Year: Sometimes true   Transportation Needs: Unmet Transportation Needs (2/5/2024)    PRAPARE - Transportation     Lack of Transportation (Medical): Yes     Lack of Transportation  "(Non-Medical): Yes   Physical Activity: Not on file   Stress: No Stress Concern Present (8/17/2022)    [employer] [place] [employer] - Occupational Stress Questionnaire     Feeling of Stress : Only a little   Social Connections: Not on file   Intimate Partner Violence: Not on file   Housing Stability: High Risk (8/17/2022)    Housing Stability Vital Sign     Unable to Pay for Housing in the Last Year: Not on file     Number of Places Lived in the Last Year: Not on file     Unstable Housing in the Last Year: Yes      Family History:     Family History   Problem Relation Age of Onset    Diabetes Mother     Cancer Father     Diabetes Father       Current Medications:     Current Outpatient Medications   Medication Sig Dispense Refill    bacitracin topical ointment 500 units/g topical ointment Apply 1 large application topically 2 (two) times a day 28 g 0    baclofen 10 mg tablet Take 1 tablet (10 mg total) by mouth 3 (three) times a day as needed for muscle spasms (Patient not taking: Reported on 2/18/2024) 20 tablet 0    cetaphil (CETAPHIL) lotion Apply topically 2 (two) times a day 237 mL 0    ibuprofen (MOTRIN) 600 mg tablet Take 1 tablet (600 mg total) by mouth every 6 (six) hours as needed for mild pain (take with food.) (Patient not taking: Reported on 2/18/2024) 30 tablet 1    methocarbamol (ROBAXIN) 500 mg tablet Take 1 tablet (500 mg total) by mouth 2 (two) times a day 20 tablet 0     No current facility-administered medications for this visit.      Allergies:     No Known Allergies   Physical Exam:     BP 98/60 (BP Location: Left arm, Patient Position: Sitting, Cuff Size: Standard)   Pulse 77   Temp 98 °F (36.7 °C) (Temporal)   Resp 16   Ht 6' 1\" (1.854 m)   Wt 75.3 kg (166 lb)   SpO2 96%   BMI 21.90 kg/m²     Physical Exam  Vitals reviewed.   Constitutional:       General: He is not in acute distress.     Appearance: Normal appearance. He is well-developed. He is not ill-appearing, " toxic-appearing or diaphoretic.   HENT:      Head: Normocephalic and atraumatic.      Right Ear: Tympanic membrane, ear canal and external ear normal. There is no impacted cerumen.      Left Ear: Tympanic membrane, ear canal and external ear normal. There is no impacted cerumen.      Nose: Nose normal.      Mouth/Throat:      Mouth: Mucous membranes are moist.      Pharynx: No oropharyngeal exudate or posterior oropharyngeal erythema.   Eyes:      General: No scleral icterus.        Right eye: No discharge.         Left eye: No discharge.      Extraocular Movements: Extraocular movements intact.      Conjunctiva/sclera: Conjunctivae normal.      Pupils: Pupils are equal, round, and reactive to light.   Cardiovascular:      Rate and Rhythm: Normal rate and regular rhythm.      Heart sounds: Normal heart sounds. No murmur heard.     No friction rub. No gallop.   Pulmonary:      Effort: Pulmonary effort is normal. No respiratory distress.      Breath sounds: Normal breath sounds. No stridor. No wheezing or rhonchi.   Abdominal:      General: Bowel sounds are normal. There is no distension.      Palpations: Abdomen is soft. There is no mass.      Tenderness: There is no abdominal tenderness. There is no guarding.      Hernia: No hernia is present.   Musculoskeletal:         General: No swelling or tenderness. Normal range of motion.      Cervical back: Normal range of motion.      Right lower leg: No edema.      Left lower leg: No edema.   Skin:     General: Skin is warm.      Capillary Refill: Capillary refill takes less than 2 seconds.      Findings: No lesion or rash.   Neurological:      General: No focal deficit present.      Mental Status: He is alert and oriented to person, place, and time.      Cranial Nerves: No cranial nerve deficit.      Motor: No weakness.      Gait: Gait normal.   Psychiatric:         Mood and Affect: Mood normal.         Behavior: Behavior normal.          Donnie Cole MD    [facility]

## 2024-03-21 ENCOUNTER — OFFICE VISIT (OUTPATIENT)
Dept: FAMILY MEDICINE CLINIC | Facility: CLINIC | Age: 39
End: 2024-03-21

## 2024-03-21 VITALS
SYSTOLIC BLOOD PRESSURE: 108 MMHG | HEIGHT: 73 IN | TEMPERATURE: 98 F | DIASTOLIC BLOOD PRESSURE: 64 MMHG | OXYGEN SATURATION: 98 % | WEIGHT: 170.7 LBS | RESPIRATION RATE: 20 BRPM | BODY MASS INDEX: 22.62 KG/M2 | HEART RATE: 76 BPM

## 2024-03-21 DIAGNOSIS — M54.6 CHRONIC LEFT-SIDED THORACIC BACK PAIN: ICD-10-CM

## 2024-03-21 DIAGNOSIS — M54.2 CERVICAL SPINE PAIN: Primary | ICD-10-CM

## 2024-03-21 DIAGNOSIS — G89.29 CHRONIC LEFT-SIDED THORACIC BACK PAIN: ICD-10-CM

## 2024-03-21 PROCEDURE — 99213 OFFICE O/P EST LOW 20 MIN: CPT | Performed by: FAMILY MEDICINE

## 2024-03-21 NOTE — ASSESSMENT & PLAN NOTE
Symptoms have improved with conservative management  Including NSAIDs/muscle relaxants/massage  Provided patient with home exercises for his neck in the AVS

## 2024-03-21 NOTE — PROGRESS NOTES
Name: Titi Hickey      : 1985      MRN: [id number]  Encounter Provider: Donnie Cole MD  Encounter Date: 3/21/2024   Encounter department: [facility]    Assessment & Plan     1. Cervical spine pain  Assessment & Plan:  Symptoms have improved with conservative management  Including NSAIDs/muscle relaxants/massage  Provided patient with home exercises for his neck in the AVS      2. Chronic left-sided thoracic back pain  Assessment & Plan:  Symptoms have improved  Continue conservative management  Avoid strenuous exercise             Subjective      38-year-old male with no significant past medical history who presents today for follow-up for his upper back and neck pain.  Patient reports chronic pain in the upper back and his neck area. This Is a chronic intermittent issue for him.  He denies any recent injury.  He has been managing the pain conservatively with heat, massage, muscle relaxant, and NSAIDs as needed.  His symptoms have improved significantly.  He has no other concerns today.      Review of Systems   Constitutional:  Negative for chills, diaphoresis and fatigue.   Respiratory:  Negative for shortness of breath.    Cardiovascular:  Negative for chest pain and palpitations.   Gastrointestinal:  Negative for abdominal pain, nausea and vomiting.   Genitourinary:  Negative for dysuria, hematuria and urgency.   Musculoskeletal:  Negative for arthralgias and back pain.   Skin:  Negative for rash.   Neurological:  Negative for seizures, syncope and numbness.       Current Outpatient Medications on File Prior to Visit   Medication Sig    bacitracin topical ointment 500 units/g topical ointment Apply 1 large application topically 2 (two) times a day    baclofen 10 mg tablet Take 1 tablet (10 mg total) by mouth 3 (three) times a day as needed for muscle spasms (Patient not taking: Reported on 2024)    cetaphil (CETAPHIL) lotion Apply topically 2  "(two) times a day    ibuprofen (MOTRIN) 600 mg tablet Take 1 tablet (600 mg total) by mouth every 6 (six) hours as needed for mild pain (take with food.) (Patient not taking: Reported on 2/18/2024)    methocarbamol (ROBAXIN) 500 mg tablet Take 1 tablet (500 mg total) by mouth 2 (two) times a day       Objective     /64 (BP Location: Left arm, Patient Position: Sitting, Cuff Size: Standard)   Pulse 76   Temp 98 °F (36.7 °C) (Temporal)   Resp 20   Ht 6' 1\" (1.854 m)   Wt 77.4 kg (170 lb 11.2 oz)   SpO2 98%   BMI 22.52 kg/m²     Physical Exam  Constitutional:       General: He is not in acute distress.     Appearance: Normal appearance. He is well-developed. He is not ill-appearing, toxic-appearing or diaphoretic.   HENT:      Head: Normocephalic and atraumatic.      Right Ear: External ear normal.      Left Ear: External ear normal.      Nose: Nose normal.      Mouth/Throat:      Pharynx: No oropharyngeal exudate.   Eyes:      General: No scleral icterus.     Extraocular Movements: Extraocular movements intact.      Conjunctiva/sclera: Conjunctivae normal.      Pupils: Pupils are equal, round, and reactive to light.   Cardiovascular:      Rate and Rhythm: Normal rate and regular rhythm.      Heart sounds: Normal heart sounds. No murmur heard.     No friction rub. No gallop.   Pulmonary:      Effort: Pulmonary effort is normal. No respiratory distress.      Breath sounds: Normal breath sounds. No stridor. No wheezing.   Abdominal:      General: Bowel sounds are normal. There is no distension.      Palpations: Abdomen is soft.      Tenderness: There is no abdominal tenderness. There is no guarding.   Musculoskeletal:         General: No deformity. Normal range of motion.      Cervical back: Normal range of motion.   Skin:     General: Skin is warm.      Capillary Refill: Capillary refill takes less than 2 seconds.   Neurological:      General: No focal deficit present.      Mental Status: He is alert and " oriented to person, place, and time. Mental status is at baseline.      Motor: No weakness.      Gait: Gait normal.       Donnie Cole MD

## 2024-04-18 ENCOUNTER — PATIENT OUTREACH (OUTPATIENT)
Dept: FAMILY MEDICINE CLINIC | Facility: CLINIC | Age: 39
End: 2024-04-18

## 2024-04-18 NOTE — PROGRESS NOTES
Chart review indicates that an order was placed on 2/5 to follow up with pt for at risk responses to social determinants of health. Financial difficulties, inability to acquire transportation and food insecurity noted. ED visit and two more OV for back pain, which appears to have improved at last visit. [facility]LEJA did work with pt in 2022 who was homeless at the time and living in a tent. Assistance with employment, [place] and SNAP provided to pt. SWCM outreached pt today to follow up on above, phone stating that call cannot be completed at this time and unable to leave a VM. Attempted x2. SWCM will try again at a later time. SWCM to follow.

## 2024-04-22 ENCOUNTER — OFFICE VISIT (OUTPATIENT)
Dept: FAMILY MEDICINE CLINIC | Facility: CLINIC | Age: 39
End: 2024-04-22

## 2024-04-22 VITALS
SYSTOLIC BLOOD PRESSURE: 108 MMHG | RESPIRATION RATE: 16 BRPM | TEMPERATURE: 98.2 F | WEIGHT: 170.9 LBS | OXYGEN SATURATION: 97 % | BODY MASS INDEX: 22.65 KG/M2 | HEART RATE: 66 BPM | HEIGHT: 73 IN | DIASTOLIC BLOOD PRESSURE: 65 MMHG

## 2024-04-22 DIAGNOSIS — H40.053 INCREASED PRESSURE IN THE EYE, BILATERAL: ICD-10-CM

## 2024-04-22 DIAGNOSIS — M54.2 CERVICAL SPINE PAIN: Primary | ICD-10-CM

## 2024-04-22 PROCEDURE — 99213 OFFICE O/P EST LOW 20 MIN: CPT | Performed by: FAMILY MEDICINE

## 2024-04-22 NOTE — ASSESSMENT & PLAN NOTE
Stable  Continue muscle relaxants and NSAIDs as needed  Patient is in the process of establishing care with a chiropractor

## 2024-04-22 NOTE — ASSESSMENT & PLAN NOTE
Patient has a history of glaucoma  He continues to experience visual disturbances bilaterally  Recommend a make an appointment to see ophthalmology soon as possible.  Referral has been given to him last visit

## 2024-04-22 NOTE — PROGRESS NOTES
Name: Titi Hickey      : 1985      MRN: [id number]  Encounter Provider: Donnie Cole MD  Encounter Date: 2024   Encounter department: [facility]    Assessment & Plan     1. Cervical spine pain  Assessment & Plan:  Stable  Continue muscle relaxants and NSAIDs as needed  Patient is in the process of establishing care with a chiropractor      2. Increased pressure in the eye, bilateral  Assessment & Plan:  Patient has a history of glaucoma  He continues to experience visual disturbances bilaterally  Recommend a make an appointment to see ophthalmology soon as possible.  Referral has been given to him last visit             Subjective      39-year-old male with no significant past medical history presents today for follow-up.  Patient reports that he is still experiencing sensation of pressure behind his eyes.  He is experiencing bilateral visual disturbances.  He has a history of glaucoma.  He has not made an appointment with ophthalmology yet.  He has a history of chronic neck pain.  He is taking muscle relaxant which helps some.  He is in the process of making an appointment to see a chiropractor.      Review of Systems   Constitutional:  Negative for chills, diaphoresis, fatigue and fever.   Eyes:  Positive for visual disturbance. Negative for pain, discharge and redness.   Respiratory:  Negative for shortness of breath.    Cardiovascular:  Negative for chest pain.   Gastrointestinal:  Negative for abdominal pain, nausea and vomiting.   Genitourinary:  Negative for dysuria, hematuria and urgency.   Musculoskeletal:  Positive for arthralgias.   Skin:  Negative for rash.   Neurological:  Negative for syncope, light-headedness and headaches.       Current Outpatient Medications on File Prior to Visit   Medication Sig    bacitracin topical ointment 500 units/g topical ointment Apply 1 large application topically 2 (two) times a day    baclofen 10 mg  "tablet Take 1 tablet (10 mg total) by mouth 3 (three) times a day as needed for muscle spasms (Patient not taking: Reported on 2/18/2024)    cetaphil (CETAPHIL) lotion Apply topically 2 (two) times a day    ibuprofen (MOTRIN) 600 mg tablet Take 1 tablet (600 mg total) by mouth every 6 (six) hours as needed for mild pain (take with food.) (Patient not taking: Reported on 2/18/2024)    methocarbamol (ROBAXIN) 500 mg tablet Take 1 tablet (500 mg total) by mouth 2 (two) times a day       Objective     /65 (BP Location: Left arm, Patient Position: Sitting, Cuff Size: Large)   Pulse 66   Temp 98.2 °F (36.8 °C) (Temporal)   Resp 16   Ht 6' 1\" (1.854 m)   Wt 77.5 kg (170 lb 14.4 oz)   SpO2 97%   BMI 22.55 kg/m²     Physical Exam  Constitutional:       General: He is not in acute distress.     Appearance: Normal appearance. He is well-developed. He is not ill-appearing, toxic-appearing or diaphoretic.   HENT:      Head: Normocephalic and atraumatic.      Right Ear: External ear normal.      Left Ear: External ear normal.      Nose: Nose normal.      Mouth/Throat:      Pharynx: No oropharyngeal exudate.   Eyes:      General: No scleral icterus.        Right eye: No discharge.         Left eye: No discharge.      Extraocular Movements: Extraocular movements intact.      Conjunctiva/sclera: Conjunctivae normal.      Pupils: Pupils are equal, round, and reactive to light.   Cardiovascular:      Rate and Rhythm: Normal rate and regular rhythm.      Heart sounds: Normal heart sounds. No murmur heard.     No friction rub. No gallop.   Pulmonary:      Effort: Pulmonary effort is normal. No respiratory distress.      Breath sounds: Normal breath sounds. No stridor. No wheezing.   Abdominal:      General: Bowel sounds are normal. There is no distension.      Palpations: Abdomen is soft. There is no mass.      Tenderness: There is no abdominal tenderness. There is no guarding.   Musculoskeletal:         General: Normal " range of motion.      Cervical back: Normal range of motion.      Right lower leg: No edema.      Left lower leg: No edema.   Skin:     General: Skin is warm.      Capillary Refill: Capillary refill takes less than 2 seconds.      Findings: No rash.   Neurological:      General: No focal deficit present.      Mental Status: He is alert and oriented to person, place, and time.      Cranial Nerves: No cranial nerve deficit.      Motor: No weakness.      Gait: Gait normal.   Psychiatric:         Mood and Affect: Mood normal.       Donnie Cole MD

## 2024-08-22 ENCOUNTER — OFFICE VISIT (OUTPATIENT)
Dept: DENTISTRY | Facility: CLINIC | Age: 39
End: 2024-08-22

## 2024-08-22 VITALS — SYSTOLIC BLOOD PRESSURE: 120 MMHG | HEART RATE: 70 BPM | TEMPERATURE: 99 F | DIASTOLIC BLOOD PRESSURE: 69 MMHG

## 2024-08-22 DIAGNOSIS — Z01.20 ENCOUNTER FOR DENTAL EXAMINATION: Primary | ICD-10-CM

## 2024-08-22 PROCEDURE — D0210 INTRAORAL - COMPLETE SERIES OF RADIOGRAPHIC IMAGES: HCPCS

## 2024-08-22 PROCEDURE — D0140 LIMITED ORAL EVALUATION - PROBLEM FOCUSED: HCPCS | Performed by: DENTIST

## 2024-08-22 NOTE — DENTAL PROCEDURE DETAILS
Subjective   Patient ID: Titi Hickey is a 39 y.o. male.  Chief Complaint   Patient presents with    New Patient Visit     Reviewed medical history    ASA 2  New patient presents for initial encounter  CC  I want my teeth cleaned and fixed up  Is aware of lost CR and crossbite #9  Several years since last dental visit   reports he has cut back on smoking but still smokes marijuana and cigarettes    FMX taken   Limited exam DR ARREOLA    Generalized supra and sub cacl noted  will need FM debridement followed by comp exam FMP/ caries charting  NV  debridement  NV 2 comp exam fm probe  TX plan devyn   will need to discuss extr # 9 and implant placement ( pt interested)    All questions answered

## 2024-10-02 NOTE — PROGRESS NOTES
Wednesday August 10, 2022    Client returned call to [facility] Nurse (PN)  Client made aware prescribed medication is at [address] [place] [address] Street and a [employer] has been sent  PCP appointment made for Monday August 15 at 1:00 PM at [facility]  Requested client to be referred to  
Opt out

## 2025-03-19 ENCOUNTER — PATIENT OUTREACH (OUTPATIENT)
Dept: OTHER | Facility: OTHER | Age: 40
End: 2025-03-19

## 2025-03-22 NOTE — PROGRESS NOTES
This Community Health Resource Nurse (CHRN) encountered client at [facility] during outreach hours. Client shared that he and SO, Donna Iniguez, are no longer together. He shared his concerns that she may be using street drugs. He reported he still loved her but couldn't watch her do things that are destroying her. CHRN provided supportive listening and emotional support. This CHRN remains available for assistance as needed.

## 2025-07-21 ENCOUNTER — APPOINTMENT (EMERGENCY)
Dept: RADIOLOGY | Facility: HOSPITAL | Age: 40
End: 2025-07-21
Payer: COMMERCIAL

## 2025-07-21 ENCOUNTER — HOSPITAL ENCOUNTER (EMERGENCY)
Facility: HOSPITAL | Age: 40
Discharge: HOME | End: 2025-07-21
Attending: EMERGENCY MEDICINE | Admitting: EMERGENCY MEDICINE
Payer: COMMERCIAL

## 2025-07-21 VITALS
DIASTOLIC BLOOD PRESSURE: 86 MMHG | TEMPERATURE: 98.2 F | HEART RATE: 81 BPM | BODY MASS INDEX: 20.4 KG/M2 | SYSTOLIC BLOOD PRESSURE: 140 MMHG | RESPIRATION RATE: 20 BRPM | OXYGEN SATURATION: 94 % | WEIGHT: 154.6 LBS

## 2025-07-21 DIAGNOSIS — F32.A DEPRESSION, UNSPECIFIED DEPRESSION TYPE: Primary | ICD-10-CM

## 2025-07-21 LAB — ETHANOL EXG-MCNC: 0 MG/DL

## 2025-07-21 PROCEDURE — 71046 X-RAY EXAM CHEST 2 VIEWS: CPT

## 2025-07-21 PROCEDURE — 99284 EMERGENCY DEPT VISIT MOD MDM: CPT

## 2025-07-21 PROCEDURE — 99284 EMERGENCY DEPT VISIT MOD MDM: CPT | Performed by: EMERGENCY MEDICINE

## 2025-07-21 PROCEDURE — 82075 ASSAY OF BREATH ETHANOL: CPT | Performed by: EMERGENCY MEDICINE

## 2025-07-21 NOTE — Clinical Note
Titi Hickey was seen and treated in our emergency department on 7/21/2025.                Diagnosis:     Titi  .    He may return on this date: 07/23/2025         If you have any questions or concerns, please don't hesitate to call.      Jaswant Ledezma MD    ______________________________           _______________          _______________  Hospital Representative                              Date                                Time

## 2025-07-21 NOTE — ED NOTES
Patient is asking for mental health resources.  He states that he is having anger issues, and anxiety with are interferring with his work.  He is a  but has not worked for 3 months  Patient does not have a mental health hx.  He has never been on medication  Patient asking for mental health resources  Gave information about clinics that take his insurance   [facility], [address]  [facility]  Pre[facility]ative [facility]  [facility]  [facility]  Patient will follow with with clinics for an outpt appointment

## 2025-07-21 NOTE — ED PROVIDER NOTES
Time reflects when diagnosis was documented in both MDM as applicable and the Disposition within this note       Time User Action Codes Description Comment    7/21/2025  4:51 PM Jaswant Ledezma Add [F32.A] Depression, unspecified depression type           ED Disposition       ED Disposition   Discharge    Condition   Stable    Date/Time   Mon Jul 21, 2025  4:50 PM    Comment   Titi Hickey discharge to home/self care.                   Assessment & Plan       Medical Decision Making  She was seen by crisis we both agree there is no grounds for admission not suicidal homicidal hallucinating and evaluated crisis and outpatient resources mild upper back pain chest x-ray shows no lobar traits or pneumothorax    Amount and/or Complexity of Data Reviewed  Labs: ordered.  Radiology: ordered and independent interpretation performed.             Medications - No data to display    ED Risk Strat Scores                    No data recorded                            History of Present Illness       Chief Complaint   Patient presents with    Psychiatric Evaluation     Self referral, pt notes he needs to she a psychiatrist, feels angry and sad at the same time, feels like he is going to explode, unable to work.        Past Medical History[1]   Past Surgical History[2]   Family History[3]   Social History[4]   E-Cigarette/Vaping    E-Cigarette Use Former User       E-Cigarette/Vaping Substances    Nicotine No     THC No     CBD No     Flavoring No     Other No     Unknown No       I have reviewed and agree with the history as documented.     Patient said he inside his head and feels both angry and sad at the same time it has been going on for months he thinks he needs a psychiatrist night being suicidal homicidal and hallucinations denies any history of mental health problems he is currently homeless denies any chest pain shortness of breath abdominal pain is a sharp pain in his upper back  He does smoke and use marijuana denies  alcohol or drug      Psychiatric Evaluation  Presenting symptoms: no hallucinations and no suicidal thoughts    Associated symptoms: no abdominal pain and no chest pain        Review of Systems   Constitutional:  Negative for chills and fever.   Respiratory:  Negative for cough and shortness of breath.    Cardiovascular:  Negative for chest pain.   Gastrointestinal:  Negative for abdominal pain and vomiting.   Skin:  Negative for color change and rash.   Neurological:  Negative for seizures and syncope.   Psychiatric/Behavioral:  Negative for confusion, hallucinations and suicidal ideas.    All other systems reviewed and are negative.          Objective       ED Triage Vitals [07/21/25 1539]   Temperature Pulse Blood Pressure Respirations SpO2 Patient Position - Orthostatic VS   98.2 °F (36.8 °C) 81 140/86 20 94 % Sitting      Temp Source Heart Rate Source BP Location FiO2 (%) Pain Score    Oral Monitor Left arm -- --      Vitals      Date and Time Temp Pulse SpO2 Resp BP Pain Score FACES Pain Rating User   07/21/25 1539 98.2 °F (36.8 °C) 81 94 % 20 140/86 -- -- EC            Physical Exam  Vitals and nursing note reviewed.   Constitutional:       General: He is not in acute distress.     Appearance: He is well-developed. He is not ill-appearing, toxic-appearing or diaphoretic.   HENT:      Head: Normocephalic and atraumatic.      Mouth/Throat:      Mouth: Mucous membranes are moist.     Eyes:      Extraocular Movements: Extraocular movements intact.      Conjunctiva/sclera: Conjunctivae normal.       Cardiovascular:      Rate and Rhythm: Normal rate and regular rhythm.      Heart sounds: No murmur heard.  Pulmonary:      Effort: Pulmonary effort is normal. No respiratory distress.      Breath sounds: Normal breath sounds. No stridor. No wheezing, rhonchi or rales.   Chest:      Chest wall: No tenderness.   Abdominal:      Palpations: Abdomen is soft.      Tenderness: There is no abdominal tenderness.      Musculoskeletal:         General: No swelling, tenderness or deformity.      Cervical back: Normal range of motion and neck supple. No rigidity or tenderness.      Comments: Range of motion no midline tenderness upper back     Skin:     General: Skin is warm and dry.      Capillary Refill: Capillary refill takes less than 2 seconds.     Neurological:      General: No focal deficit present.      Mental Status: He is alert.      Cranial Nerves: No cranial nerve deficit.      Sensory: No sensory deficit.      Motor: No weakness.      Coordination: Coordination normal.      Gait: Gait normal.      Comments: Oriented   Psychiatric:         Mood and Affect: Mood normal.         Behavior: Behavior normal.         Thought Content: Thought content normal.         Judgment: Judgment normal.         Results Reviewed       Procedure Component Value Units Date/Time    POCT alcohol breath test [[id number]]  (Normal) Collected: 07/21/25 1640    Lab Status: Final result Updated: 07/21/25 1640     EXTBreath Alcohol 0.00    Rapid drug screen, urine [[id number]]     Lab Status: No result Specimen: Urine             XR chest 2 views   ED Interpretation by Jaswant Ledezma MD (07/21 6347)   nad          Procedures    ED Medication and Procedure Management   Prior to Admission Medications   Prescriptions Last Dose Informant Patient Reported? Taking?   bacitracin topical ointment 500 units/g topical ointment   No No   Sig: Apply 1 large application topically 2 (two) times a day   Patient not taking: Reported on 8/22/2024   baclofen 10 mg tablet   No No   Sig: Take 1 tablet (10 mg total) by mouth 3 (three) times a day as needed for muscle spasms   Patient not taking: Reported on 2/18/2024   cetaphil (CETAPHIL) lotion   No No   Sig: Apply topically 2 (two) times a day   Patient not taking: Reported on 8/22/2024   ibuprofen (MOTRIN) 600 mg tablet   No No   Sig: Take 1 tablet (600 mg total) by mouth every 6 (six) hours as needed for mild pain  (take with food.)   Patient not taking: Reported on 2/18/2024   methocarbamol (ROBAXIN) 500 mg tablet   No No   Sig: Take 1 tablet (500 mg total) by mouth 2 (two) times a day   Patient not taking: Reported on 8/22/2024      Facility-Administered Medications: None     Patient's Medications   Discharge Prescriptions    No medications on file     No discharge procedures on file.  ED SEPSIS DOCUMENTATION   Time reflects when diagnosis was documented in both MDM as applicable and the Disposition within this note       Time User Action Codes Description Comment    7/21/2025  4:51 PM Jaswant Ledezma Add [F32.A] Depression, unspecified depression type                      [1]   Past Medical History:  Diagnosis Date    Bleeding gums     Dental caries    [2] No past surgical history on file.  [3]   Family History  Problem Relation Name Age of Onset    Diabetes Mother      Cancer Father      Diabetes Father     [4]   Social History  Tobacco Use    Smoking status: Every Day     Current packs/day: 0.50     Types: Cigarettes    Smokeless tobacco: Former   Vaping Use    Vaping status: Former   Substance Use Topics    Alcohol use: Never    Drug use: Yes     Types: Marijuana        Jaswant Ledezma MD  07/21/25 2927

## 2025-07-22 ENCOUNTER — RESULTS FOLLOW-UP (OUTPATIENT)
Dept: EMERGENCY DEPT | Facility: HOSPITAL | Age: 40
End: 2025-07-22

## 2025-07-22 NOTE — RESULT ENCOUNTER NOTE
Plan to contact patient to discuss symptoms to better clinically correlate xray results and determine need for antibiotics and to discuss recommended follow up. Unable to contact patient, only number listed was not in service.  No [employer] logins for over a year. Forward to PCP.  Letter sent.

## 2025-07-22 NOTE — LETTER
July 22, 2025     Titi Hickey  [address] [place] [address]  [address] [place] [id number]      Dear Mr. Hickey:      Please call [facility] Emergency Department in regards to results from testing and/or imaging which was conducted at your recent visit. Our phone number is [phone]         Sincerely,    ER Staff